# Patient Record
Sex: FEMALE | Race: WHITE | NOT HISPANIC OR LATINO | URBAN - METROPOLITAN AREA
[De-identification: names, ages, dates, MRNs, and addresses within clinical notes are randomized per-mention and may not be internally consistent; named-entity substitution may affect disease eponyms.]

---

## 2017-01-10 ENCOUNTER — ALLSCRIPTS OFFICE VISIT (OUTPATIENT)
Dept: OTHER | Facility: OTHER | Age: 52
End: 2017-01-10

## 2017-03-29 ENCOUNTER — ALLSCRIPTS OFFICE VISIT (OUTPATIENT)
Dept: OTHER | Facility: OTHER | Age: 52
End: 2017-03-29

## 2017-06-21 ENCOUNTER — ALLSCRIPTS OFFICE VISIT (OUTPATIENT)
Dept: OTHER | Facility: OTHER | Age: 52
End: 2017-06-21

## 2017-09-13 ENCOUNTER — ALLSCRIPTS OFFICE VISIT (OUTPATIENT)
Dept: OTHER | Facility: OTHER | Age: 52
End: 2017-09-13

## 2017-12-12 ENCOUNTER — ALLSCRIPTS OFFICE VISIT (OUTPATIENT)
Dept: OTHER | Facility: OTHER | Age: 52
End: 2017-12-12

## 2017-12-28 ENCOUNTER — GENERIC CONVERSION - ENCOUNTER (OUTPATIENT)
Dept: OTHER | Facility: OTHER | Age: 52
End: 2017-12-28

## 2017-12-28 DIAGNOSIS — Z12.31 ENCOUNTER FOR SCREENING MAMMOGRAM FOR MALIGNANT NEOPLASM OF BREAST: ICD-10-CM

## 2018-01-09 NOTE — PROGRESS NOTES
Chief Complaint  pt presents for her depo injection, given in her left buttock  Marko Garg [id number] LOT [id number] EXP 09/2019      Active Problems    1  Abnormal mammogram (793 80) (R92 8)   2  Contraceptives (V25 02)   3  Depo contraception (V25 49) (Z30 40)   4  Diverticulitis (562 11) (K57 92)   5  Encounter for cervical Pap smear with pelvic exam (V76 2,V72 31) (Z01 419)   6  Encounter for routine gynecological examination (V72 31) (Z01 419)   7  Encounter for routine gynecological examination (V72 31) (Z01 419)   8  Encounter for routine gynecological examination with Papanicolaou smear of cervix   (V72 31,V76 2) (Z01 419)   9  Encounter for screening mammogram for malignant neoplasm of breast (V76 12)   (Z12 31)   10  Screening for HPV (human papillomavirus) (V73 81) (Z11 51)   11  Thyroid disorder (246 9) (E07 9)   12  Thyroid goiter (240 9) (E04 9)    Current Meds   1  MedroxyPROGESTERone Acetate 150 MG/ML Intramuscular Suspension; inject every 12   weeks as directed; Therapy: 07SDE0034 to (MCSVNKZT:64JCO9448)  Requested for: 24DVB5657; Last   Rx:09Jan2017 Ordered    Allergies    1   No Known Drug Allergies    Signatures   Electronically signed by : LIANG Pagan ; Apr  3 2017 10:23AM EST                       (Author)

## 2018-01-09 NOTE — PROGRESS NOTES
Chief Complaint  pt presents for her depo injection  Given in her left buttock  Marko Garg [id number] LOT [id number] EXP 02/2020      Active Problems    1  Abnormal mammogram (793 80) (R92 8)   2  Contraceptives (V25 02)   3  Depo contraception (V25 49) (Z30 40)   4  Diverticulitis (562 11) (K57 92)   5  Encounter for cervical Pap smear with pelvic exam (V76 2,V72 31) (Z01 419)   6  Encounter for routine gynecological examination (V72 31) (Z01 419)   7  Encounter for routine gynecological examination (V72 31) (Z01 419)   8  Encounter for routine gynecological examination with Papanicolaou smear of cervix   (V72 31,V76 2) (Z01 419)   9  Encounter for screening mammogram for malignant neoplasm of breast (V76 12)   (Z12 31)   10  Screening for HPV (human papillomavirus) (V73 81) (Z11 51)   11  Thyroid disorder (246 9) (E07 9)   12  Thyroid goiter (240 9) (E04 9)    Current Meds   1  MedroxyPROGESTERone Acetate 150 MG/ML Intramuscular Suspension; inject every 12   weeks as directed; Therapy: 25PLN7962 to (UEYGMTFD:57MOM5298)  Requested for: 55VCA9376; Last   Rx:09Jan2017 Ordered    Allergies    1   No Known Drug Allergies    Plan  SocHx: Depo contraception    · MedroxyPROGESTERone Acetate 150 MG/ML Intramuscular Suspension    Future Appointments    Date/Time Provider Specialty Site   12/06/2017 09:00 AM Nina Murillo DO Obstetrics/Gynecology [facility]     Signatures   Electronically signed by : Lacey Parr DO; Sep 13 2017  9:07AM EST                       (Author)

## 2018-01-12 NOTE — PROGRESS NOTES
Chief Complaint  Pt presents today for her depo injection given IM in her L buttock  Waiver signed  Pt brought along her own medication  Marko Garg [id number] Lot [id number] Exp 04/2020      Active Problems    1  Abnormal mammogram (793 80) (R92 8)   2  Contraceptives (V25 02)   3  Depo contraception (V25 49) (Z30 40)   4  Diverticulitis (562 11) (K57 92)   5  Encounter for cervical Pap smear with pelvic exam (V76 2,V72 31) (Z01 419)   6  Encounter for routine gynecological examination (V72 31) (Z01 419)   7  Encounter for routine gynecological examination (V72 31) (Z01 419)   8  Encounter for routine gynecological examination with Papanicolaou smear of cervix   (V72 31,V76 2) (Z01 419)   9  Encounter for screening mammogram for malignant neoplasm of breast (V76 12)   (Z12 31)   10  Screening for HPV (human papillomavirus) (V73 81) (Z11 51)   11  Thyroid disorder (246 9) (E07 9)   12  Thyroid goiter (240 9) (E04 9)    Current Meds   1  MedroxyPROGESTERone Acetate 150 MG/ML Intramuscular Suspension; inject every 12   weeks as directed; Therapy: 82FGW0487 to (Evaluate:28Jan2017)  Requested for: 75NKE3048; Last   Rx:[id number] Ordered    Allergies    1   No Known Drug Allergies    Plan  SocHx: Depo contraception    · MedroxyPROGESTERone Acetate 150 MG/ML Intramuscular Suspension    Future Appointments    Date/Time Provider Specialty Site   [facility]2017 08[facility]00 [facility]   03/27/2017 09:00 AM Estela Solis DO Obstetrics/Gynecology [facility]     Signatures   Electronically signed by : Amy Meza DO; Oct 18 2016 12:01PM EST                       (Author)

## 2018-01-13 NOTE — PROGRESS NOTES
Chief Complaint  Pt presents for depo injection in right buttock  ms      Active Problems    1  Abnormal mammogram (793 80) (R92 8)   2  Contraceptives (V25 02)   3  Depo contraception (V25 49) (Z30 40)   4  Diverticulitis (562 11) (K57 92)   5  Encounter for cervical Pap smear with pelvic exam (V76 2,V72 31) (Z01 419)   6  Encounter for routine gynecological examination (V72 31) (Z01 419)   7  Encounter for routine gynecological examination (V72 31) (Z01 419)   8  Encounter for routine gynecological examination with Papanicolaou smear of cervix   (V72 31,V76 2) (Z01 419)   9  Encounter for screening mammogram for malignant neoplasm of breast (V76 12)   (Z12 31)   10  Screening for HPV (human papillomavirus) (V73 81) (Z11 51)   11  Thyroid disorder (246 9) (E07 9)   12  Thyroid goiter (240 9) (E04 9)    Current Meds   1  MedroxyPROGESTERone Acetate 150 MG/ML Intramuscular Suspension; inject every 12   weeks as directed; Therapy: 72ECY3825 to (Evaluate:28Jan2017)  Requested for: 92DOR3747; Last   Rx:[id number] Ordered    Allergies    1   No Known Drug Allergies    Plan  SocHx: Depo contraception    · MedroxyPROGESTERone Acetate 150 MG/ML Intramuscular Suspension  (Depo-Provera)    Future Appointments    Date/Time Provider Specialty Site   10/18/2016 08:00 AM [facility] Nurse Schedule  [facility]   03/27/2017 09:00 AM Karl Harris DO Obstetrics/Gynecology [facility]     Signatures   Electronically signed by : Hemal Galindo, ; Jul 26 2016  8:12AM EST                       (Author)    Electronically signed by : Nicolette Estrella DO; Jul 26 2016  9:13AM EST                       (Author)

## 2018-01-14 NOTE — PROGRESS NOTES
Chief Complaint  Pt was given depo shot in R side of butt  Patient signed the waiver  [id number] 07/2019      Active Problems    1  Abnormal mammogram (793 80) (R92 8)   2  Contraceptives (V25 02)   3  Depo contraception (V25 49) (Z30 40)   4  Diverticulitis (562 11) (K57 92)   5  Encounter for cervical Pap smear with pelvic exam (V76 2,V72 31) (Z01 419)   6  Encounter for routine gynecological examination (V72 31) (Z01 419)   7  Encounter for routine gynecological examination (V72 31) (Z01 419)   8  Encounter for routine gynecological examination with Papanicolaou smear of cervix   (V72 31,V76 2) (Z01 419)   9  Encounter for screening mammogram for malignant neoplasm of breast (V76 12)   (Z12 31)   10  Screening for HPV (human papillomavirus) (V73 81) (Z11 51)   11  Thyroid disorder (246 9) (E07 9)   12  Thyroid goiter (240 9) (E04 9)    Current Meds   1  MedroxyPROGESTERone Acetate 150 MG/ML Intramuscular Suspension; inject every 12   weeks as directed; Therapy: 29WAJ2747 to (FQOLIFTU:01DTL7706)  Requested for: 06CFA5360; Last   Rx:09Jan2017 Ordered    Allergies    1  No Known Drug Allergies    Plan  SocHx: Depo contraception    · MedroxyPROGESTERone Acetate 150 MG/ML Intramuscular Suspension    Future Appointments    Date/Time Provider Specialty Site   04/11/2017 09:40 AM Mercedes Strickland DO Obstetrics/Gynecology [facility]     Signatures   Electronically signed by :  Pedro Butler, ; Pete 10 2017  8:29AM EST                       (Co-author)    Electronically signed by : LIANG Phillips ; Apr  3 2017 10:23AM EST                       (Author)

## 2018-01-17 NOTE — PROGRESS NOTES
Chief Complaint  Pt presents for Depo injection  Given in left buttocks  Pt tolerating Depol  RTO 12 weeks  Active Problems    1  Abnormal mammogram (793 80) (R92 8)   2  Contraceptives (V25 02)   3  Depo contraception (V25 49) (Z30 40)   4  Diverticulitis (562 11) (K57 92)   5  Encounter for cervical Pap smear with pelvic exam (V76 2,V72 31) (Z01 419)   6  Encounter for routine gynecological examination (V72 31) (Z01 419)   7  Encounter for routine gynecological examination (V72 31) (Z01 419)   8  Encounter for routine gynecological examination with Papanicolaou smear of cervix   (V72 31,V76 2) (Z01 419)   9  Encounter for screening mammogram for malignant neoplasm of breast (V76 12)   (Z12 31)   10  Screening for HPV (human papillomavirus) (V73 81) (Z11 51)   11  Thyroid disorder (246 9) (E07 9)   12  Thyroid goiter (240 9) (E04 9)    Current Meds   1  MedroxyPROGESTERone Acetate 150 MG/ML Intramuscular Suspension; inject every 12   weeks as directed; Therapy: 28CQE0049 to (Evaluate:52Pwh8619)  Requested for: 75FXG1460; Last   Rx:[id number] Ordered    Allergies    1   No Known Drug Allergies    Future Appointments    Date/Time Provider Specialty Site   07/26/2016 08:00 AM [facility] Nurse Schedule  [facility]   03/27/2017 09:00 AM Grace Hamilton DO Obstetrics/Gynecology [facility]     Signatures   Electronically signed by : Anson Chen, ; May  3 2016  8:12AM EST                       (Co-author)    Electronically signed by : Narciso Eugene DO; May  3 2016  9:27AM EST                       (Author)

## 2018-01-17 NOTE — PROGRESS NOTES
Chief Complaint  Pt presents for Depo injection, Pt tolerating Depo well  Given in right buttocks  RTO 12 weeks  Active Problems    1  Abnormal mammogram (793 80) (R92 8)   2  Contraceptives (V25 02)   3  Depo contraception (V25 49) (Z30 49)   4  Diverticulitis (562 11) (K57 92)   5  Encounter for routine gynecological examination (V72 31) (Z01 419)   6  Encounter for routine gynecological examination (V72 31) (Z01 419)    Current Meds   1  MedroxyPROGESTERone Acetate 150 MG/ML Intramuscular Suspension; inject every 12   weeks as directed; Therapy: 93YSA2593 to (Evaluate:28Jan2017)  Requested for: 47GDB7957; Last   Rx:[id number] Ordered   2  MedroxyPROGESTERone Acetate 150 MG/ML Intramuscular Suspension; INJECT   EVERY 12 WEEKS AS DIRECTED; Therapy: 07Yai2080-  Requested for: 72TJK3429; Last Rx:72Beu5192; Status: NEED   INFORMATION - Billable Problem Ordered    Allergies    1   No Known Drug Allergies    Plan  SocHx: Depo contraception    · MedroxyPROGESTERone Acetate 150 MG/ML Intramuscular Suspension  (Depo-Provera)    Future Appointments    Date/Time Provider Specialty Site   05/03/2016 08:00 AM [facility] Nurse Schedule  [facility]   03/21/2016 08:00 AM Ivan Field DO Obstetrics/Gynecology [facility]     Signatures   Electronically signed by : Candace Raza, ; Feb 9 2016  9:18AM EST                       (Co-author)    Electronically signed by : Chandni Gerardo DO; Feb 9 2016  9:39AM EST                       (Author)

## 2018-01-18 NOTE — PROGRESS NOTES
Chief Complaint  Pt  presents for her depo injection given in her R buttock  Waiver signed  Marko Garg [id number]  Lot [id number]  Exp 12/2019  Active Problems    1  Abnormal mammogram (793 80) (R92 8)   2  Contraceptives (V25 02)   3  Depo contraception (V25 49) (Z30 40)   4  Diverticulitis (562 11) (K57 92)   5  Encounter for cervical Pap smear with pelvic exam (V76 2,V72 31) (Z01 419)   6  Encounter for routine gynecological examination (V72 31) (Z01 419)   7  Encounter for routine gynecological examination (V72 31) (Z01 419)   8  Encounter for routine gynecological examination with Papanicolaou smear of cervix   (V72 31,V76 2) (Z01 419)   9  Encounter for screening mammogram for malignant neoplasm of breast (V76 12)   (Z12 31)   10  Screening for HPV (human papillomavirus) (V73 81) (Z11 51)   11  Thyroid disorder (246 9) (E07 9)   12  Thyroid goiter (240 9) (E04 9)    Current Meds   1  MedroxyPROGESTERone Acetate 150 MG/ML Intramuscular Suspension; inject every 12   weeks as directed; Therapy: 96BLF3419 to (MPOMDYFP:45JJQ2032)  Requested for: 66NRB5582; Last   Rx:09Jan2017 Ordered    Allergies    1   No Known Drug Allergies    Plan  Contraceptives, SocHx: Depo contraception    · MedroxyPROGESTERone Acetate 150 MG/ML Intramuscular Suspension    Future Appointments    Date/Time Provider Specialty Site   09/13/2017 08:15 AM [facility] Nurse Schedule  [facility] [address]     Signatures   Electronically signed by : Josesito Harkins DO; Jun 21 2017  9:29AM EST                       (Author)

## 2018-01-23 NOTE — PROGRESS NOTES
Chief Complaint  pt presents in office for depo injection  given in right gluteus  LOT: [id number]  EXP: 04/2020  NDC: 5907962094      Active Problems    1  Abnormal mammogram (793 80) (R92 8)   2  Contraceptives (V25 02)   3  Depo contraception (V25 49) (Z30 40)   4  Diverticulitis (562 11) (K57 92)   5  Encounter for cervical Pap smear with pelvic exam (V76 2,V72 31) (Z01 419)   6  Encounter for routine gynecological examination (V72 31) (Z01 419)   7  Encounter for routine gynecological examination (V72 31) (Z01 419)   8  Encounter for routine gynecological examination with Papanicolaou smear of cervix   (V72 31,V76 2) (Z01 419)   9  Encounter for screening mammogram for malignant neoplasm of breast (V76 12)   (Z12 31)   10  Screening for HPV (human papillomavirus) (V73 81) (Z11 51)   11  Thyroid disorder (246 9) (E07 9)   12  Thyroid goiter (240 9) (E04 9)    Current Meds   1  MedroxyPROGESTERone Acetate 150 MG/ML Intramuscular Suspension; inject every 12   weeks as directed; Therapy: 61MQK3443 to (Amita Mckeon)  Requested for: 35VVP3744; Last   Rx:[id number] Ordered    Allergies    1   No Known Drug Allergies    Plan  Encounter for Depo-Provera contraception    · MedroxyPROGESTERone Acetate 150 MG/ML Intramuscular Suspension    Future Appointments    Date/Time Provider Specialty Site   03/06/2018 08:00 AM [facility], Nurse Schedule  [facility]   12/28/2017 09:40 AM Deidra Sorensen DO Obstetrics/Gynecology [facility]     Signatures   Electronically signed by : Greg Miller DO; Dec 12 2017  4:52PM EST                       (Author)

## 2018-01-24 VITALS — WEIGHT: 202.25 LBS | BODY MASS INDEX: 34.72 KG/M2

## 2018-01-24 VITALS — DIASTOLIC BLOOD PRESSURE: 70 MMHG | SYSTOLIC BLOOD PRESSURE: 118 MMHG

## 2018-03-20 ENCOUNTER — CLINICAL SUPPORT (OUTPATIENT)
Dept: OBGYN CLINIC | Facility: MEDICAL CENTER | Age: 53
End: 2018-03-20
Payer: COMMERCIAL

## 2018-03-20 DIAGNOSIS — Z30.42 ENCOUNTER FOR DEPO-PROVERA CONTRACEPTION: Primary | ICD-10-CM

## 2018-03-20 PROCEDURE — 96372 THER/PROPH/DIAG INJ SC/IM: CPT | Performed by: OBSTETRICS & GYNECOLOGY

## 2018-03-20 RX ORDER — MEDROXYPROGESTERONE ACETATE 150 MG/ML
150 INJECTION, SUSPENSION INTRAMUSCULAR ONCE
Status: COMPLETED | OUTPATIENT
Start: 2018-03-20 | End: 2018-03-20

## 2018-03-20 RX ADMIN — MEDROXYPROGESTERONE ACETATE 150 MG: 150 INJECTION, SUSPENSION INTRAMUSCULAR at 13:32

## 2018-03-20 NOTE — PROGRESS NOTES
Pt presents for depo injection given IM left buttocks  Pt tolerated well  RTO 12 weeks  Waiver signed      Marko Kumar 47 [id number]  Lot [id number]  Exp 05/2020

## 2018-06-12 ENCOUNTER — CLINICAL SUPPORT (OUTPATIENT)
Dept: OBGYN CLINIC | Facility: MEDICAL CENTER | Age: 53
End: 2018-06-12
Payer: COMMERCIAL

## 2018-06-12 DIAGNOSIS — Z30.42 ENCOUNTER FOR SURVEILLANCE OF INJECTABLE CONTRACEPTIVE: Primary | ICD-10-CM

## 2018-06-12 PROCEDURE — 96372 THER/PROPH/DIAG INJ SC/IM: CPT | Performed by: OBSTETRICS & GYNECOLOGY

## 2018-06-12 RX ORDER — MEDROXYPROGESTERONE ACETATE 150 MG/ML
150 INJECTION, SUSPENSION INTRAMUSCULAR
Status: COMPLETED | OUTPATIENT
Start: 2018-06-12 | End: 2018-11-06

## 2018-06-12 RX ADMIN — MEDROXYPROGESTERONE ACETATE 150 MG: 150 INJECTION, SUSPENSION INTRAMUSCULAR at 08:21

## 2018-09-11 ENCOUNTER — CLINICAL SUPPORT (OUTPATIENT)
Dept: OBGYN CLINIC | Facility: MEDICAL CENTER | Age: 53
End: 2018-09-11
Payer: COMMERCIAL

## 2018-09-11 DIAGNOSIS — Z30.42 ENCOUNTER FOR SURVEILLANCE OF INJECTABLE CONTRACEPTIVE: Primary | ICD-10-CM

## 2018-09-11 PROCEDURE — 96372 THER/PROPH/DIAG INJ SC/IM: CPT | Performed by: OBSTETRICS & GYNECOLOGY

## 2018-09-11 RX ADMIN — MEDROXYPROGESTERONE ACETATE 150 MG: 150 INJECTION, SUSPENSION INTRAMUSCULAR at 10:04

## 2018-11-05 DIAGNOSIS — Z30.42 ENCOUNTER FOR SURVEILLANCE OF INJECTABLE CONTRACEPTIVE: Primary | ICD-10-CM

## 2018-11-05 RX ORDER — MEDROXYPROGESTERONE ACETATE 150 MG/ML
150 INJECTION, SUSPENSION INTRAMUSCULAR
Qty: 1 ML | Refills: 1 | Status: SHIPPED | OUTPATIENT
Start: 2018-11-05 | End: 2019-04-26 | Stop reason: SDUPTHER

## 2018-11-06 ENCOUNTER — CLINICAL SUPPORT (OUTPATIENT)
Dept: OBGYN CLINIC | Facility: MEDICAL CENTER | Age: 53
End: 2018-11-06
Payer: COMMERCIAL

## 2018-11-06 DIAGNOSIS — Z30.42 ENCOUNTER FOR DEPO-PROVERA CONTRACEPTION: Primary | ICD-10-CM

## 2018-11-06 PROCEDURE — 96372 THER/PROPH/DIAG INJ SC/IM: CPT | Performed by: OBSTETRICS & GYNECOLOGY

## 2018-11-06 RX ADMIN — MEDROXYPROGESTERONE ACETATE 150 MG: 150 INJECTION, SUSPENSION INTRAMUSCULAR at 08:11

## 2018-11-06 NOTE — PROGRESS NOTES
Here for depo  Patient supplied   Patient scheduled at 8 weeks-agreeable to receive medication early  NDC# [address]-3  Lot# [id number]  Exp 3/2021  Waiver signed

## 2019-01-29 ENCOUNTER — CLINICAL SUPPORT (OUTPATIENT)
Dept: OBGYN CLINIC | Facility: MEDICAL CENTER | Age: 54
End: 2019-01-29
Payer: COMMERCIAL

## 2019-01-29 DIAGNOSIS — Z30.42 ENCOUNTER FOR MANAGEMENT AND INJECTION OF DEPO-PROVERA: Primary | ICD-10-CM

## 2019-01-29 PROCEDURE — 96372 THER/PROPH/DIAG INJ SC/IM: CPT | Performed by: OBSTETRICS & GYNECOLOGY

## 2019-01-29 RX ORDER — MEDROXYPROGESTERONE ACETATE 150 MG/ML
150 INJECTION, SUSPENSION INTRAMUSCULAR
Status: COMPLETED | OUTPATIENT
Start: 2019-01-29 | End: 2019-04-26

## 2019-01-29 RX ADMIN — MEDROXYPROGESTERONE ACETATE 150 MG: 150 INJECTION, SUSPENSION INTRAMUSCULAR at 09:45

## 2019-01-29 NOTE — PROGRESS NOTES
Pt presents for her depo injection, given in her right buttock   Marko Kumar 47 [address]-4  EXP 05/2021  LOT [id number]

## 2019-04-20 DIAGNOSIS — Z30.42 ENCOUNTER FOR SURVEILLANCE OF INJECTABLE CONTRACEPTIVE: ICD-10-CM

## 2019-04-22 RX ORDER — MEDROXYPROGESTERONE ACETATE 150 MG/ML
150 INJECTION, SUSPENSION INTRAMUSCULAR
Qty: 1 ML | Refills: 1 | OUTPATIENT
Start: 2019-04-22

## 2019-04-24 DIAGNOSIS — Z30.42 ENCOUNTER FOR SURVEILLANCE OF INJECTABLE CONTRACEPTIVE: ICD-10-CM

## 2019-04-24 RX ORDER — MEDROXYPROGESTERONE ACETATE 150 MG/ML
150 INJECTION, SUSPENSION INTRAMUSCULAR
Qty: 1 ML | Refills: 1 | OUTPATIENT
Start: 2019-04-24

## 2019-04-26 ENCOUNTER — ANNUAL EXAM (OUTPATIENT)
Dept: OBGYN CLINIC | Facility: MEDICAL CENTER | Age: 54
End: 2019-04-26
Payer: COMMERCIAL

## 2019-04-26 VITALS
WEIGHT: 214 LBS | HEIGHT: 64 IN | SYSTOLIC BLOOD PRESSURE: 120 MMHG | BODY MASS INDEX: 36.54 KG/M2 | DIASTOLIC BLOOD PRESSURE: 70 MMHG

## 2019-04-26 DIAGNOSIS — Z12.31 ENCOUNTER FOR SCREENING MAMMOGRAM FOR MALIGNANT NEOPLASM OF BREAST: ICD-10-CM

## 2019-04-26 DIAGNOSIS — Z01.419 ENCNTR FOR GYN EXAM (GENERAL) (ROUTINE) W/O ABN FINDINGS: Primary | ICD-10-CM

## 2019-04-26 DIAGNOSIS — Z30.42 ENCOUNTER FOR SURVEILLANCE OF INJECTABLE CONTRACEPTIVE: ICD-10-CM

## 2019-04-26 PROCEDURE — S0612 ANNUAL GYNECOLOGICAL EXAMINA: HCPCS | Performed by: OBSTETRICS & GYNECOLOGY

## 2019-04-26 RX ORDER — MEDROXYPROGESTERONE ACETATE 150 MG/ML
150 INJECTION, SUSPENSION INTRAMUSCULAR
Qty: 1 ML | Refills: 3 | Status: SHIPPED | OUTPATIENT
Start: 2019-04-26 | End: 2020-06-10

## 2019-04-26 RX ADMIN — MEDROXYPROGESTERONE ACETATE 150 MG: 150 INJECTION, SUSPENSION INTRAMUSCULAR at 10:45

## 2019-07-22 ENCOUNTER — CLINICAL SUPPORT (OUTPATIENT)
Dept: OBGYN CLINIC | Facility: MEDICAL CENTER | Age: 54
End: 2019-07-22
Payer: COMMERCIAL

## 2019-07-22 DIAGNOSIS — Z30.42 ENCOUNTER FOR SURVEILLANCE OF INJECTABLE CONTRACEPTIVE: Primary | ICD-10-CM

## 2019-07-22 PROCEDURE — 96372 THER/PROPH/DIAG INJ SC/IM: CPT | Performed by: OBSTETRICS & GYNECOLOGY

## 2019-07-22 RX ORDER — MEDROXYPROGESTERONE ACETATE 150 MG/ML
150 INJECTION, SUSPENSION INTRAMUSCULAR
Status: COMPLETED | OUTPATIENT
Start: 2019-07-22 | End: 2019-10-14

## 2019-07-22 RX ADMIN — MEDROXYPROGESTERONE ACETATE 150 MG: 150 INJECTION, SUSPENSION INTRAMUSCULAR at 09:05

## 2019-10-14 ENCOUNTER — CLINICAL SUPPORT (OUTPATIENT)
Dept: OBGYN CLINIC | Facility: MEDICAL CENTER | Age: 54
End: 2019-10-14
Payer: COMMERCIAL

## 2019-10-14 DIAGNOSIS — Z30.42 ENCOUNTER FOR MANAGEMENT AND INJECTION OF DEPO-PROVERA: Primary | ICD-10-CM

## 2019-10-14 PROCEDURE — 96372 THER/PROPH/DIAG INJ SC/IM: CPT | Performed by: OBSTETRICS & GYNECOLOGY

## 2019-10-14 RX ADMIN — MEDROXYPROGESTERONE ACETATE 150 MG: 150 INJECTION, SUSPENSION INTRAMUSCULAR at 08:18

## 2019-10-14 NOTE — PROGRESS NOTES
Patient here for depo  Waiver signed  Patient supplied  Given IM left upper outer quad    [address]: [id number]  YSX:CQ4946  FIQ:49/6336

## 2020-01-09 ENCOUNTER — CLINICAL SUPPORT (OUTPATIENT)
Dept: OBGYN CLINIC | Facility: MEDICAL CENTER | Age: 55
End: 2020-01-09
Payer: COMMERCIAL

## 2020-01-09 DIAGNOSIS — Z30.42 ENCOUNTER FOR MANAGEMENT AND INJECTION OF DEPO-PROVERA: Primary | ICD-10-CM

## 2020-01-09 PROCEDURE — 96372 THER/PROPH/DIAG INJ SC/IM: CPT | Performed by: OBSTETRICS & GYNECOLOGY

## 2020-01-09 RX ADMIN — MEDROXYPROGESTERONE ACETATE 150 MG: 150 INJECTION, SUSPENSION INTRAMUSCULAR at 08:00

## 2020-01-09 NOTE — PROGRESS NOTES
Here for depo  Patient supplied  Given IM in right upper outer quad    Waiver signed  NDC# [address]-3  Lot# [id number]  Exp 08/2021

## 2020-01-13 RX ORDER — MEDROXYPROGESTERONE ACETATE 150 MG/ML
150 INJECTION, SUSPENSION INTRAMUSCULAR
Status: COMPLETED | OUTPATIENT
Start: 2020-01-13 | End: 2020-03-19

## 2020-02-06 DIAGNOSIS — Z12.31 ENCOUNTER FOR SCREENING MAMMOGRAM FOR MALIGNANT NEOPLASM OF BREAST: ICD-10-CM

## 2020-03-19 ENCOUNTER — CLINICAL SUPPORT (OUTPATIENT)
Dept: OBGYN CLINIC | Facility: MEDICAL CENTER | Age: 55
End: 2020-03-19

## 2020-03-19 DIAGNOSIS — Z30.42 ENCOUNTER FOR MANAGEMENT AND INJECTION OF DEPO-PROVERA: ICD-10-CM

## 2020-03-19 PROCEDURE — 96372 THER/PROPH/DIAG INJ SC/IM: CPT | Performed by: OBSTETRICS & GYNECOLOGY

## 2020-03-19 RX ADMIN — MEDROXYPROGESTERONE ACETATE 150 MG: 150 INJECTION, SUSPENSION INTRAMUSCULAR at 08:05

## 2020-03-19 NOTE — PROGRESS NOTES
Here for depo  Patient supplied  Given IM in left upper outer quad    Waiver signed  NDC# [address]-5  [facility]#[id number]  Exp 02/2022

## 2020-06-09 DIAGNOSIS — Z30.42 ENCOUNTER FOR SURVEILLANCE OF INJECTABLE CONTRACEPTIVE: ICD-10-CM

## 2020-06-10 RX ORDER — MEDROXYPROGESTERONE ACETATE 150 MG/ML
150 INJECTION, SUSPENSION INTRAMUSCULAR
Qty: 1 ML | Refills: 3 | Status: SHIPPED | OUTPATIENT
Start: 2020-06-10 | End: 2020-06-12 | Stop reason: SDUPTHER

## 2020-06-12 ENCOUNTER — ANNUAL EXAM (OUTPATIENT)
Dept: OBGYN CLINIC | Facility: MEDICAL CENTER | Age: 55
End: 2020-06-12
Payer: COMMERCIAL

## 2020-06-12 VITALS
HEIGHT: 63 IN | WEIGHT: 217 LBS | DIASTOLIC BLOOD PRESSURE: 84 MMHG | BODY MASS INDEX: 38.45 KG/M2 | SYSTOLIC BLOOD PRESSURE: 120 MMHG

## 2020-06-12 DIAGNOSIS — Z30.42 ENCOUNTER FOR SURVEILLANCE OF INJECTABLE CONTRACEPTIVE: ICD-10-CM

## 2020-06-12 DIAGNOSIS — Z01.419 ENCOUNTER FOR WELL WOMAN EXAM WITH ROUTINE GYNECOLOGICAL EXAM: Primary | ICD-10-CM

## 2020-06-12 PROCEDURE — G0145 SCR C/V CYTO,THINLAYER,RESCR: HCPCS | Performed by: OBSTETRICS & GYNECOLOGY

## 2020-06-12 PROCEDURE — 87624 HPV HI-RISK TYP POOLED RSLT: CPT | Performed by: OBSTETRICS & GYNECOLOGY

## 2020-06-12 PROCEDURE — 96372 THER/PROPH/DIAG INJ SC/IM: CPT | Performed by: OBSTETRICS & GYNECOLOGY

## 2020-06-12 PROCEDURE — S0612 ANNUAL GYNECOLOGICAL EXAMINA: HCPCS | Performed by: OBSTETRICS & GYNECOLOGY

## 2020-06-12 RX ORDER — MEDROXYPROGESTERONE ACETATE 150 MG/ML
150 INJECTION, SUSPENSION INTRAMUSCULAR ONCE
Status: CANCELLED | OUTPATIENT
Start: 2020-06-12 | End: 2020-06-12

## 2020-06-12 RX ORDER — MEDROXYPROGESTERONE ACETATE 150 MG/ML
150 INJECTION, SUSPENSION INTRAMUSCULAR ONCE
Status: COMPLETED | OUTPATIENT
Start: 2020-06-12 | End: 2020-06-12

## 2020-06-12 RX ORDER — LEVOTHYROXINE SODIUM 175 UG/1
TABLET ORAL
COMMUNITY
Start: 2020-05-18

## 2020-06-12 RX ORDER — MEDROXYPROGESTERONE ACETATE 150 MG/ML
150 INJECTION, SUSPENSION INTRAMUSCULAR
Qty: 1 ML | Refills: 3 | Status: SHIPPED | OUTPATIENT
Start: 2020-06-12 | End: 2020-06-18 | Stop reason: SDUPTHER

## 2020-06-12 RX ADMIN — MEDROXYPROGESTERONE ACETATE 150 MG: 150 INJECTION, SUSPENSION INTRAMUSCULAR at 10:02

## 2020-06-16 LAB
HPV HR 12 DNA CVX QL NAA+PROBE: POSITIVE
HPV16 DNA CVX QL NAA+PROBE: NEGATIVE
HPV18 DNA CVX QL NAA+PROBE: NEGATIVE

## 2020-06-18 DIAGNOSIS — Z30.42 ENCOUNTER FOR SURVEILLANCE OF INJECTABLE CONTRACEPTIVE: ICD-10-CM

## 2020-06-18 RX ORDER — MEDROXYPROGESTERONE ACETATE 150 MG/ML
150 INJECTION, SUSPENSION INTRAMUSCULAR
Qty: 1 ML | Refills: 3 | Status: SHIPPED | OUTPATIENT
Start: 2020-06-18 | End: 2021-07-23

## 2020-06-23 LAB
LAB AP GYN PRIMARY INTERPRETATION: NORMAL
Lab: NORMAL

## 2020-07-06 ENCOUNTER — TELEPHONE (OUTPATIENT)
Dept: OBGYN CLINIC | Facility: MEDICAL CENTER | Age: 55
End: 2020-07-06

## 2020-07-06 NOTE — TELEPHONE ENCOUNTER
The patient did call back and she was informed that of her results and the plan to have her come back in one year for another pap smear

## 2020-07-06 NOTE — TELEPHONE ENCOUNTER
----- Message from Larry Myrick MD sent at 7/5/2020 10:16 AM EDT -----  Please call patient with results  Pap testing is abnormal  The cells of her cervix appear normal, however she flagged positive for HPV  HPV is the virus that causes cervical cancer  There are higher risk strains of it that are strongly associated wit  h cervical cancer, and fortunately she did not test positive for these strains  I do recommend closer follow up by repeating her pap in 1yr to see if the cells have changed or if the HPV is present/resolved

## 2020-09-03 ENCOUNTER — CLINICAL SUPPORT (OUTPATIENT)
Dept: OBGYN CLINIC | Facility: MEDICAL CENTER | Age: 55
End: 2020-09-03
Payer: COMMERCIAL

## 2020-09-03 DIAGNOSIS — Z30.42 ENCOUNTER FOR SURVEILLANCE OF INJECTABLE CONTRACEPTIVE: Primary | ICD-10-CM

## 2020-09-03 PROCEDURE — 96372 THER/PROPH/DIAG INJ SC/IM: CPT | Performed by: OBSTETRICS & GYNECOLOGY

## 2020-09-03 PROCEDURE — 96372 THER/PROPH/DIAG INJ SC/IM: CPT | Performed by: ADVANCED PRACTICE MIDWIFE

## 2020-09-03 RX ORDER — MEDROXYPROGESTERONE ACETATE 150 MG/ML
150 INJECTION, SUSPENSION INTRAMUSCULAR
Status: COMPLETED | OUTPATIENT
Start: 2020-09-03 | End: 2020-12-08

## 2020-09-03 RX ADMIN — MEDROXYPROGESTERONE ACETATE 150 MG: 150 INJECTION, SUSPENSION INTRAMUSCULAR at 08:08

## 2020-09-03 NOTE — PROGRESS NOTES
Here for depo  Patient supplied    Given IM in [address] [place] [address],[address]  NDC# [address]-4  Lot# [id number]  Exp 04/2022

## 2020-12-08 ENCOUNTER — CLINICAL SUPPORT (OUTPATIENT)
Dept: OBGYN CLINIC | Facility: MEDICAL CENTER | Age: 55
End: 2020-12-08
Payer: COMMERCIAL

## 2020-12-08 DIAGNOSIS — Z30.42 ENCOUNTER FOR MANAGEMENT AND INJECTION OF DEPO-PROVERA: Primary | ICD-10-CM

## 2020-12-08 PROCEDURE — 96372 THER/PROPH/DIAG INJ SC/IM: CPT | Performed by: OBSTETRICS & GYNECOLOGY

## 2020-12-08 RX ADMIN — MEDROXYPROGESTERONE ACETATE 150 MG: 150 INJECTION, SUSPENSION INTRAMUSCULAR at 08:07

## 2021-03-01 ENCOUNTER — CLINICAL SUPPORT (OUTPATIENT)
Dept: OBGYN CLINIC | Facility: MEDICAL CENTER | Age: 56
End: 2021-03-01
Payer: COMMERCIAL

## 2021-03-01 DIAGNOSIS — Z30.42 ENCOUNTER FOR MANAGEMENT AND INJECTION OF DEPO-PROVERA: Primary | ICD-10-CM

## 2021-03-01 PROCEDURE — 96372 THER/PROPH/DIAG INJ SC/IM: CPT | Performed by: OBSTETRICS & GYNECOLOGY

## 2021-03-01 RX ORDER — MEDROXYPROGESTERONE ACETATE 150 MG/ML
150 INJECTION, SUSPENSION INTRAMUSCULAR ONCE
Status: COMPLETED | OUTPATIENT
Start: 2021-03-01 | End: 2021-03-01

## 2021-03-01 RX ADMIN — MEDROXYPROGESTERONE ACETATE 150 MG: 150 INJECTION, SUSPENSION INTRAMUSCULAR at 08:58

## 2021-03-01 NOTE — PROGRESS NOTES
Here for depo  Patient supplied    Given IM in left upper outer quad  NDC# [address]-1  Lot #[id number]  Exp 09/2022

## 2021-05-21 ENCOUNTER — CLINICAL SUPPORT (OUTPATIENT)
Dept: OBGYN CLINIC | Facility: MEDICAL CENTER | Age: 56
End: 2021-05-21
Payer: COMMERCIAL

## 2021-05-21 DIAGNOSIS — Z30.42 ENCOUNTER FOR MANAGEMENT AND INJECTION OF DEPO-PROVERA: Primary | ICD-10-CM

## 2021-05-21 PROCEDURE — 96372 THER/PROPH/DIAG INJ SC/IM: CPT | Performed by: STUDENT IN AN ORGANIZED HEALTH CARE EDUCATION/TRAINING PROGRAM

## 2021-05-21 RX ORDER — MEDROXYPROGESTERONE ACETATE 150 MG/ML
150 INJECTION, SUSPENSION INTRAMUSCULAR ONCE
Status: COMPLETED | OUTPATIENT
Start: 2021-05-21 | End: 2021-05-21

## 2021-05-21 RX ADMIN — MEDROXYPROGESTERONE ACETATE 150 MG: 150 INJECTION, SUSPENSION INTRAMUSCULAR at 08:15

## 2021-05-21 NOTE — PROGRESS NOTES
Here for depo  Patient supplied    Given IM in [address]  NDC# [address]-5  LOT# [id number]  Exp 03/2023

## 2021-08-11 ENCOUNTER — TELEPHONE (OUTPATIENT)
Dept: OBGYN CLINIC | Facility: MEDICAL CENTER | Age: 56
End: 2021-08-11

## 2021-08-11 DIAGNOSIS — Z30.42 ENCOUNTER FOR SURVEILLANCE OF INJECTABLE CONTRACEPTIVE: ICD-10-CM

## 2021-08-11 RX ORDER — MEDROXYPROGESTERONE ACETATE 150 MG/ML
150 INJECTION, SUSPENSION INTRAMUSCULAR
Qty: 1 ML | Refills: 3 | Status: SHIPPED | OUTPATIENT
Start: 2021-08-11

## 2021-08-11 NOTE — TELEPHONE ENCOUNTER
Pt called needs DEPO-PROVERA [facility] on  in [facility]  Pt has depo scheduled for 8/13 needs completed as soon as possible  She tried to contact [facility] and they would not [facility] to [facility]  Please review

## 2021-08-13 ENCOUNTER — CLINICAL SUPPORT (OUTPATIENT)
Dept: OBGYN CLINIC | Facility: MEDICAL CENTER | Age: 56
End: 2021-08-13
Payer: COMMERCIAL

## 2021-08-13 DIAGNOSIS — Z30.42 ENCOUNTER FOR SURVEILLANCE OF INJECTABLE CONTRACEPTIVE: Primary | ICD-10-CM

## 2021-08-13 PROCEDURE — 96372 THER/PROPH/DIAG INJ SC/IM: CPT | Performed by: OBSTETRICS & GYNECOLOGY

## 2021-08-13 RX ORDER — MEDROXYPROGESTERONE ACETATE 150 MG/ML
150 INJECTION, SUSPENSION INTRAMUSCULAR ONCE
Status: COMPLETED | OUTPATIENT
Start: 2021-08-13 | End: 2021-08-13

## 2021-08-13 RX ADMIN — MEDROXYPROGESTERONE ACETATE 150 MG: 150 INJECTION, SUSPENSION INTRAMUSCULAR at 07:56

## 2021-08-13 NOTE — PROGRESS NOTES
Here for depo  Patient supplied  Given IM in [address] [facility] must schedule annual exam with next depo      NDC# 09920-978-12  [facility]#[id number]  Exp 03/2024

## 2021-11-12 ENCOUNTER — CLINICAL SUPPORT (OUTPATIENT)
Dept: OBGYN CLINIC | Facility: MEDICAL CENTER | Age: 56
End: 2021-11-12
Payer: COMMERCIAL

## 2021-11-12 DIAGNOSIS — Z30.42 ENCOUNTER FOR MANAGEMENT AND INJECTION OF DEPO-PROVERA: Primary | ICD-10-CM

## 2021-11-12 PROCEDURE — 96372 THER/PROPH/DIAG INJ SC/IM: CPT | Performed by: OBSTETRICS & GYNECOLOGY

## 2021-11-12 RX ORDER — MEDROXYPROGESTERONE ACETATE 150 MG/ML
150 INJECTION, SUSPENSION INTRAMUSCULAR ONCE
Status: COMPLETED | OUTPATIENT
Start: 2021-11-12 | End: 2021-11-12

## 2021-11-12 RX ADMIN — MEDROXYPROGESTERONE ACETATE 150 MG: 150 INJECTION, SUSPENSION INTRAMUSCULAR at 08:10

## 2022-01-31 ENCOUNTER — ANNUAL EXAM (OUTPATIENT)
Dept: OBGYN CLINIC | Facility: MEDICAL CENTER | Age: 57
End: 2022-01-31
Payer: COMMERCIAL

## 2022-01-31 VITALS
SYSTOLIC BLOOD PRESSURE: 122 MMHG | DIASTOLIC BLOOD PRESSURE: 80 MMHG | BODY MASS INDEX: 40.57 KG/M2 | WEIGHT: 229 LBS | HEIGHT: 63 IN

## 2022-01-31 DIAGNOSIS — Z12.11 COLON CANCER SCREENING: ICD-10-CM

## 2022-01-31 DIAGNOSIS — Z01.419 ENCOUNTER FOR WELL WOMAN EXAM WITH ROUTINE GYNECOLOGICAL EXAM: Primary | ICD-10-CM

## 2022-01-31 DIAGNOSIS — Z12.31 SCREENING MAMMOGRAM FOR BREAST CANCER: ICD-10-CM

## 2022-01-31 DIAGNOSIS — N95.1 PERIMENOPAUSAL: ICD-10-CM

## 2022-01-31 PROCEDURE — G0476 HPV COMBO ASSAY CA SCREEN: HCPCS | Performed by: OBSTETRICS & GYNECOLOGY

## 2022-01-31 PROCEDURE — G0145 SCR C/V CYTO,THINLAYER,RESCR: HCPCS | Performed by: OBSTETRICS & GYNECOLOGY

## 2022-01-31 PROCEDURE — 99396 PREV VISIT EST AGE 40-64: CPT | Performed by: OBSTETRICS & GYNECOLOGY

## 2022-01-31 NOTE — PROGRESS NOTES
Assessment   64 y o  A6V0620 with amenorrhea secondary to depo-provera use who is sexually active presenting for annual exam      Plan   Diagnoses and all orders for this visit:    Encounter for well woman exam with routine gynecological exam  -     Liquid-based pap, screening  -     HPV High Risk  - Mammo ordered  - Colonoscopy up to date  - Return in 1yr for yearly    Screening mammogram for breast cancer  -     Mammo screening bilateral w 3d & cad; Future    Colon cancer screening  - Up to date    Perimenopausal  -     Follicle stimulating hormone; Future  -     Estradiol; Future  - Will decide whether to continue depo based on levels      __________________________________________________________________      Subjective     Patience MANDA Loyd is a 64 y o  L7N0098 with amenorrhea secondary to depo-provera use who is sexually active presenting for annual exam  She is without complaint  Due for depo today but didn't have refill  We discussed age and psb perimenopausal  Hx very heavy menses and good control on depo; hesitant to prematurely d/c use  Discussed screening with labs and limitations of this  She has no bleeding currently and no perimenopausal symptoms       GYN  Complaints: denies  Denies change in menstrual cycle, dysmenorrhea, dyspareunia, genital discharge, genital ulcers, irregular/heavy menses, pelvic pain and vulvar/vaginal symptoms  Uses depo secondary to a hx of heavy menses  Working well and amenorrheic on this medication  Dysmenorrhea:none     Cyclic symptoms include none  Sexually active: Yes - single partner - male ()  Hx STI: denies   Hx Abnormal pap: HPV (2020)   Last pap: 2020 - NILM, +HPV other     OB  R3N7523 (SVDx2)  Pregnancy complications: denies       Complaints: denies  Denies urinary frequency, hematuria, urinary incontinence and dysuria     BREAST  Complaints: denies  Denies: breast lump, breast tenderness, changed mole, dryness, nipple discharge, pruritus, rash, skin color change and skin lesion(s)  Last mammogram: 2021 - birads 1  Personal hx: reduction mammoplasty   Family hx: denies fhx of uterine, ovarian cancers  Sister with breast ca (45s), no genetic testing  Paternal grandmother with colon ca   Patient does do regular self-exams     GENERAL  PMH reviewed/updated and is as below  Patient does follow with a PCP  Works as a   Denies domestic violence  Exercise: garden, walking  Diet: nothing specific     SCREENING  Cervical Ca: pap collected  Breast Ca: mammo via PCP, clinicians breast exam and teaching done  Colon Ca: 2019 - repeat in 5yrs      Past Medical History:   Diagnosis Date    Diverticulitis     Perforated sigmoid colon ([address] )     PONV (postoperative nausea and vomiting)      (spontaneous vaginal delivery)     x 2       Past Surgical History:   Procedure Laterality Date    COLECTOMY      COLONOSCOPY W/ POLYPECTOMY      onset: 2015; complete    COLOSTOMY      COLOSTOMY CLOSURE      EXPLORATORY LAPAROTOMY      EXPLORATORY LAPAROTOMY W/ BOWEL RESECTION      KNEE SURGERY      REDUCTION MAMMAPLASTY      THYROIDECTOMY           Current Outpatient Medications:     levothyroxine 175 mcg tablet, TAKE 1 TABLET BY MOUTH EVERY DAY, Disp: , Rfl:     medroxyPROGESTERone (DEPO-PROVERA) 150 mg/mL injection, Inject 1 mL (150 mg total) into a muscle every 3 (three) months, Disp: 1 mL, Rfl: 3    Multiple Vitamins-Minerals (CENTRUM SILVER 50+WOMEN PO), Centrum Silver, Disp: , Rfl:     No Known Allergies    Social History     Tobacco Use    Smoking status: Never Smoker    Smokeless tobacco: Never Used   Vaping Use    Vaping Use: Never used   Substance Use Topics    Alcohol use: No    Drug use: No           Objective  /80 (BP Location: Left arm, Patient Position: Sitting, Cuff Size: Adult)   Ht 5' 3" (1 6 m)   Wt 104 kg (229 lb)   BMI 40 57 kg/m²      Physical Exam:  Physical Exam  Constitutional:       General: She is not in acute distress  Appearance: Normal appearance  She is well-developed  She is not ill-appearing, toxic-appearing or diaphoretic  HENT:      Head: Normocephalic and atraumatic  Eyes:      General: No scleral icterus  Right eye: No discharge  Left eye: No discharge  Conjunctiva/sclera: Conjunctivae normal    Cardiovascular:      Rate and Rhythm: Normal rate  Pulmonary:      Effort: Pulmonary effort is normal  No accessory muscle usage or respiratory distress  Chest:   Breasts:      Right: No inverted nipple, mass, nipple discharge, skin change or tenderness  Left: No inverted nipple, mass, nipple discharge, skin change or tenderness  Abdominal:      General: There is no distension  Palpations: Abdomen is soft  There is no mass  Tenderness: There is no abdominal tenderness  There is no guarding or rebound  Genitourinary:     Labia:         Right: No rash, tenderness or lesion  Left: No rash, tenderness or lesion  Vagina: No signs of injury  No vaginal discharge, erythema or tenderness  Cervix: No cervical motion tenderness or discharge  Uterus: Not enlarged, not fixed and not tender  Adnexa:         Right: No mass, tenderness or fullness  Left: No mass, tenderness or fullness  Rectum: No external hemorrhoid  Normal anal tone  Comments: Urethral meatus: normal  Skin:     General: Skin is warm and dry  Coloration: Skin is not jaundiced  Findings: No bruising, erythema or rash  Neurological:      Mental Status: She is alert  Psychiatric:         Mood and Affect: Mood normal          Behavior: Behavior normal          Thought Content:  Thought content normal          Judgment: Judgment normal

## 2022-02-02 LAB
HPV HR 12 DNA CVX QL NAA+PROBE: NEGATIVE
HPV16 DNA CVX QL NAA+PROBE: NEGATIVE
HPV18 DNA CVX QL NAA+PROBE: NEGATIVE

## 2022-02-03 LAB
LAB AP GYN PRIMARY INTERPRETATION: NORMAL
Lab: NORMAL

## 2022-02-04 DIAGNOSIS — N95.1 PERIMENOPAUSAL: Primary | ICD-10-CM
